# Patient Record
Sex: FEMALE | URBAN - METROPOLITAN AREA
[De-identification: names, ages, dates, MRNs, and addresses within clinical notes are randomized per-mention and may not be internally consistent; named-entity substitution may affect disease eponyms.]

---

## 2020-05-21 ENCOUNTER — OFFICE VISIT (OUTPATIENT)
Dept: URBAN - METROPOLITAN AREA CLINIC 52 | Facility: CLINIC | Age: 75
End: 2020-05-21
Payer: MEDICARE

## 2020-05-21 DIAGNOSIS — H26.492 OTHER SECONDARY CATARACT, LEFT EYE: Primary | ICD-10-CM

## 2020-05-21 PROCEDURE — 99204 OFFICE O/P NEW MOD 45 MIN: CPT | Performed by: OPHTHALMOLOGY

## 2020-05-21 ASSESSMENT — INTRAOCULAR PRESSURE
OS: 16
OD: 15

## 2020-05-21 ASSESSMENT — VISUAL ACUITY
OD: 20/60
OS: 20/400

## 2020-05-21 NOTE — IMPRESSION/PLAN
Impression: Age-related nuclear cataract, right eye: H25.11. Plan: OK for ce/iol OD in [address] 29, RL2. Distance target. Discussed lens options, Standard. Pt is not a candidate for premium lens, due to standard OS. Discussed need for glasses for near vision with standard. Discussed diagnosis of cataracts. Cataracts are limiting vision. Discussed risks, benefits and alternatives to surgery including but not limited to: bleeding, infection, risk of vision loss, loss of the eye, need for other surgery. Patient voiced understanding and wishes to proceed.

## 2020-05-21 NOTE — IMPRESSION/PLAN
Impression: Other secondary cataract, left eye: H26.492. Plan: Discussed diagnosis in detail with patient. Recommend YAG OS. Discussed R/B/A of YAG PC, patient voices understanding and elects to proceed.

## 2020-05-21 NOTE — IMPRESSION/PLAN
Impression: Other secondary cataract, left eye: H26.492. Plan: Very poor view of post pole. NEED TO CHECK MR AFTER YAG TO SEE IF PT HYPEROPIC,--WILL AFFECT IOL SELECETION OD.   EXPLD WILL LIKELY NEED GLASSES FAR AND NEAR FULL TIME

## 2020-06-02 ENCOUNTER — SURGERY (OUTPATIENT)
Dept: URBAN - METROPOLITAN AREA SURGERY 20 | Facility: SURGERY | Age: 75
End: 2020-06-02
Payer: MEDICARE

## 2020-06-02 PROCEDURE — 66821 AFTER CATARACT LASER SURGERY: CPT | Performed by: OPHTHALMOLOGY

## 2020-06-10 ENCOUNTER — TESTING ONLY (OUTPATIENT)
Dept: URBAN - METROPOLITAN AREA CLINIC 45 | Facility: CLINIC | Age: 75
End: 2020-06-10
Payer: MEDICARE

## 2020-06-10 DIAGNOSIS — H25.11 AGE-RELATED NUCLEAR CATARACT, RIGHT EYE: Primary | ICD-10-CM

## 2020-06-10 PROCEDURE — 76519 ECHO EXAM OF EYE: CPT | Performed by: OPHTHALMOLOGY

## 2020-06-10 ASSESSMENT — PACHYMETRY
OS: 22.92
OD: 4.67
OD: 22.75
OS: 0.00

## 2020-06-11 ENCOUNTER — PRE-OPERATIVE VISIT (OUTPATIENT)
Dept: URBAN - METROPOLITAN AREA CLINIC 52 | Facility: CLINIC | Age: 75
End: 2020-06-11
Payer: MEDICARE

## 2020-06-11 DIAGNOSIS — H25.13 AGE-RELATED NUCLEAR CATARACT, BILATERAL: Primary | ICD-10-CM

## 2020-06-11 PROCEDURE — 92012 INTRM OPH EXAM EST PATIENT: CPT | Performed by: OPHTHALMOLOGY

## 2020-06-11 ASSESSMENT — VISUAL ACUITY
OS: 20/60
OD: 20/60

## 2020-06-11 NOTE — IMPRESSION/PLAN
Impression: Age-related nuclear cataract, bilateral: H25.13. Plan: OK for ce/iol OD in [address] 29, RL2. Distance target. Discussed lens options, Standard. Pt is not a candidate for premium lens, due to standard OS. Discussed need for glasses for near vision with standard. Discussed diagnosis of cataracts. Cataracts are limiting vision. Discussed risks, benefits and alternatives to surgery including but not limited to: bleeding, infection, risk of vision loss, loss of the eye, need for other surgery. Patient voiced understanding and wishes to proceed. **** S/p Yag OS- doing well, IOP stable.

## 2020-06-12 RX ORDER — KETOROLAC TROMETHAMINE 5 MG/ML
0.5 % SOLUTION OPHTHALMIC
Qty: 1 | Refills: 1 | Status: ACTIVE
Start: 2020-06-12

## 2020-06-12 RX ORDER — PREDNISOLONE ACETATE 10 MG/ML
1 % SUSPENSION/ DROPS OPHTHALMIC
Qty: 1 | Refills: 1 | Status: ACTIVE
Start: 2020-06-12

## 2020-06-12 RX ORDER — OFLOXACIN 3 MG/ML
0.3 % SOLUTION/ DROPS OPHTHALMIC
Qty: 1 | Refills: 1 | Status: ACTIVE
Start: 2020-06-12

## 2020-07-14 ENCOUNTER — SURGERY (OUTPATIENT)
Dept: URBAN - METROPOLITAN AREA SURGERY 20 | Facility: SURGERY | Age: 75
End: 2020-07-14
Payer: MEDICARE

## 2020-07-14 PROCEDURE — 66984 XCAPSL CTRC RMVL W/O ECP: CPT | Performed by: OPHTHALMOLOGY

## 2020-07-16 ENCOUNTER — POST-OPERATIVE VISIT (OUTPATIENT)
Dept: URBAN - METROPOLITAN AREA CLINIC 52 | Facility: CLINIC | Age: 75
End: 2020-07-16

## 2020-07-16 DIAGNOSIS — Z48.810 ENCOUNTER FOR SURGICAL AFTERCARE FOLLOWING SURGERY ON A SENSE ORGAN: Primary | ICD-10-CM

## 2020-07-16 PROCEDURE — 99024 POSTOP FOLLOW-UP VISIT: CPT | Performed by: OPHTHALMOLOGY

## 2020-07-16 ASSESSMENT — INTRAOCULAR PRESSURE
OS: 14
OD: 16
OD: 14

## 2020-07-16 NOTE — IMPRESSION/PLAN
Impression: S/P Phaco - PC IOL SA60WF +24.5 OD - 2 Days. Encounter for surgical aftercare following surgery on a sense organ  Z48.810.  Excellent post op course   Post operative instructions reviewed - Condition is improving - Plan: Pt to keep scheduled appointment --Continue Ketorolac 0.5%--Continue Ofloxacin 0.3%--Continue Pred Forte

## 2020-07-31 ENCOUNTER — POST-OPERATIVE VISIT (OUTPATIENT)
Dept: URBAN - METROPOLITAN AREA CLINIC 52 | Facility: CLINIC | Age: 75
End: 2020-07-31

## 2020-07-31 DIAGNOSIS — Z96.1 PRESENCE OF INTRAOCULAR LENS: Primary | ICD-10-CM

## 2020-07-31 PROCEDURE — 99024 POSTOP FOLLOW-UP VISIT: CPT | Performed by: OPHTHALMOLOGY

## 2020-07-31 ASSESSMENT — VISUAL ACUITY: OD: 20/25

## 2020-07-31 ASSESSMENT — INTRAOCULAR PRESSURE
OD: 14
OS: 14

## 2020-07-31 NOTE — IMPRESSION/PLAN
Impression: S/P Phaco - PC IOL SA60WF +24.5 OD - 17 Days. Presence of intraocular lens  Z96.1. Excellent post op course   Post operative instructions reviewed - Condition is improving - Plan: Recommend yearly dilated exams. Pt left hyperopic on purpose due to previous SX OS No MRX given today --Advised patient to use artificial tears for comfort. --Finish Pred Forte